# Patient Record
Sex: FEMALE | Race: WHITE | NOT HISPANIC OR LATINO | URBAN - METROPOLITAN AREA
[De-identification: names, ages, dates, MRNs, and addresses within clinical notes are randomized per-mention and may not be internally consistent; named-entity substitution may affect disease eponyms.]

---

## 2017-01-08 ENCOUNTER — EMERGENCY (EMERGENCY)
Facility: HOSPITAL | Age: 82
LOS: 1 days | Discharge: HOME | End: 2017-01-08
Attending: EMERGENCY MEDICINE | Admitting: EMERGENCY MEDICINE
Payer: MEDICARE

## 2017-01-08 VITALS
SYSTOLIC BLOOD PRESSURE: 130 MMHG | DIASTOLIC BLOOD PRESSURE: 68 MMHG | HEART RATE: 90 BPM | RESPIRATION RATE: 21 BRPM | OXYGEN SATURATION: 100 %

## 2017-01-08 VITALS
DIASTOLIC BLOOD PRESSURE: 98 MMHG | HEART RATE: 110 BPM | OXYGEN SATURATION: 96 % | RESPIRATION RATE: 25 BRPM | TEMPERATURE: 98 F | SYSTOLIC BLOOD PRESSURE: 153 MMHG

## 2017-01-08 DIAGNOSIS — E03.9 HYPOTHYROIDISM, UNSPECIFIED: ICD-10-CM

## 2017-01-08 DIAGNOSIS — Z88.1 ALLERGY STATUS TO OTHER ANTIBIOTIC AGENTS STATUS: ICD-10-CM

## 2017-01-08 DIAGNOSIS — J45.901 UNSPECIFIED ASTHMA WITH (ACUTE) EXACERBATION: ICD-10-CM

## 2017-01-08 DIAGNOSIS — R06.02 SHORTNESS OF BREATH: ICD-10-CM

## 2017-01-08 DIAGNOSIS — Z88.5 ALLERGY STATUS TO NARCOTIC AGENT: ICD-10-CM

## 2017-01-08 LAB
ALBUMIN SERPL ELPH-MCNC: 4.4 G/DL — SIGNIFICANT CHANGE UP (ref 3.3–5)
ALP SERPL-CCNC: 65 U/L — SIGNIFICANT CHANGE UP (ref 40–120)
ALT FLD-CCNC: 17 U/L RC — SIGNIFICANT CHANGE UP (ref 10–45)
ANION GAP SERPL CALC-SCNC: 17 MMOL/L — SIGNIFICANT CHANGE UP (ref 5–17)
AST SERPL-CCNC: 26 U/L — SIGNIFICANT CHANGE UP (ref 10–40)
BASOPHILS # BLD AUTO: 0 K/UL — SIGNIFICANT CHANGE UP (ref 0–0.2)
BASOPHILS NFR BLD AUTO: 0.5 % — SIGNIFICANT CHANGE UP (ref 0–2)
BILIRUB SERPL-MCNC: 0.6 MG/DL — SIGNIFICANT CHANGE UP (ref 0.2–1.2)
BUN SERPL-MCNC: 18 MG/DL — SIGNIFICANT CHANGE UP (ref 7–23)
CALCIUM SERPL-MCNC: 9.6 MG/DL — SIGNIFICANT CHANGE UP (ref 8.4–10.5)
CHLORIDE SERPL-SCNC: 101 MMOL/L — SIGNIFICANT CHANGE UP (ref 96–108)
CO2 SERPL-SCNC: 24 MMOL/L — SIGNIFICANT CHANGE UP (ref 22–31)
CREAT SERPL-MCNC: 0.94 MG/DL — SIGNIFICANT CHANGE UP (ref 0.5–1.3)
EOSINOPHIL # BLD AUTO: 0.1 K/UL — SIGNIFICANT CHANGE UP (ref 0–0.5)
EOSINOPHIL NFR BLD AUTO: 3.1 % — SIGNIFICANT CHANGE UP (ref 0–6)
GAS PNL BLDV: SIGNIFICANT CHANGE UP
GLUCOSE SERPL-MCNC: 121 MG/DL — HIGH (ref 70–99)
HCT VFR BLD CALC: 31.8 % — LOW (ref 34.5–45)
HGB BLD-MCNC: 11 G/DL — LOW (ref 11.5–15.5)
LYMPHOCYTES # BLD AUTO: 1 K/UL — SIGNIFICANT CHANGE UP (ref 1–3.3)
LYMPHOCYTES # BLD AUTO: 22.7 % — SIGNIFICANT CHANGE UP (ref 13–44)
MCHC RBC-ENTMCNC: 23 PG — LOW (ref 27–34)
MCHC RBC-ENTMCNC: 34.4 GM/DL — SIGNIFICANT CHANGE UP (ref 32–36)
MCV RBC AUTO: 66.8 FL — LOW (ref 80–100)
MONOCYTES # BLD AUTO: 0.7 K/UL — SIGNIFICANT CHANGE UP (ref 0–0.9)
MONOCYTES NFR BLD AUTO: 14.8 % — HIGH (ref 2–14)
NEUTROPHILS # BLD AUTO: 2.7 K/UL — SIGNIFICANT CHANGE UP (ref 1.8–7.4)
NEUTROPHILS NFR BLD AUTO: 58.9 % — SIGNIFICANT CHANGE UP (ref 43–77)
PLATELET # BLD AUTO: 81 K/UL — LOW (ref 150–400)
POTASSIUM SERPL-MCNC: 4 MMOL/L — SIGNIFICANT CHANGE UP (ref 3.5–5.3)
POTASSIUM SERPL-SCNC: 4 MMOL/L — SIGNIFICANT CHANGE UP (ref 3.5–5.3)
PROT SERPL-MCNC: 7.3 G/DL — SIGNIFICANT CHANGE UP (ref 6–8.3)
RAPID RVP RESULT: SIGNIFICANT CHANGE UP
RBC # BLD: 4.76 M/UL — SIGNIFICANT CHANGE UP (ref 3.8–5.2)
RBC # FLD: 17.3 % — HIGH (ref 10.3–14.5)
SODIUM SERPL-SCNC: 142 MMOL/L — SIGNIFICANT CHANGE UP (ref 135–145)
TROPONIN T SERPL-MCNC: <0.01 NG/ML — SIGNIFICANT CHANGE UP (ref 0–0.06)
WBC # BLD: 4.6 K/UL — SIGNIFICANT CHANGE UP (ref 3.8–10.5)
WBC # FLD AUTO: 4.6 K/UL — SIGNIFICANT CHANGE UP (ref 3.8–10.5)

## 2017-01-08 PROCEDURE — 87633 RESP VIRUS 12-25 TARGETS: CPT

## 2017-01-08 PROCEDURE — 82435 ASSAY OF BLOOD CHLORIDE: CPT

## 2017-01-08 PROCEDURE — 93005 ELECTROCARDIOGRAM TRACING: CPT

## 2017-01-08 PROCEDURE — 87581 M.PNEUMON DNA AMP PROBE: CPT

## 2017-01-08 PROCEDURE — 82330 ASSAY OF CALCIUM: CPT

## 2017-01-08 PROCEDURE — 71045 X-RAY EXAM CHEST 1 VIEW: CPT

## 2017-01-08 PROCEDURE — 82947 ASSAY GLUCOSE BLOOD QUANT: CPT

## 2017-01-08 PROCEDURE — 84132 ASSAY OF SERUM POTASSIUM: CPT

## 2017-01-08 PROCEDURE — 96374 THER/PROPH/DIAG INJ IV PUSH: CPT

## 2017-01-08 PROCEDURE — 85027 COMPLETE CBC AUTOMATED: CPT

## 2017-01-08 PROCEDURE — 99284 EMERGENCY DEPT VISIT MOD MDM: CPT

## 2017-01-08 PROCEDURE — 87486 CHLMYD PNEUM DNA AMP PROBE: CPT

## 2017-01-08 PROCEDURE — 99284 EMERGENCY DEPT VISIT MOD MDM: CPT | Mod: 25

## 2017-01-08 PROCEDURE — 94640 AIRWAY INHALATION TREATMENT: CPT

## 2017-01-08 PROCEDURE — 71010: CPT | Mod: 26

## 2017-01-08 PROCEDURE — 87798 DETECT AGENT NOS DNA AMP: CPT

## 2017-01-08 PROCEDURE — 82803 BLOOD GASES ANY COMBINATION: CPT

## 2017-01-08 PROCEDURE — 83605 ASSAY OF LACTIC ACID: CPT

## 2017-01-08 PROCEDURE — 80053 COMPREHEN METABOLIC PANEL: CPT

## 2017-01-08 PROCEDURE — 84484 ASSAY OF TROPONIN QUANT: CPT

## 2017-01-08 PROCEDURE — 84295 ASSAY OF SERUM SODIUM: CPT

## 2017-01-08 PROCEDURE — 85014 HEMATOCRIT: CPT

## 2017-01-08 RX ORDER — SODIUM CHLORIDE 9 MG/ML
1000 INJECTION INTRAMUSCULAR; INTRAVENOUS; SUBCUTANEOUS ONCE
Qty: 0 | Refills: 0 | Status: COMPLETED | OUTPATIENT
Start: 2017-01-08 | End: 2017-01-08

## 2017-01-08 RX ORDER — IPRATROPIUM/ALBUTEROL SULFATE 18-103MCG
3 AEROSOL WITH ADAPTER (GRAM) INHALATION ONCE
Qty: 0 | Refills: 0 | Status: COMPLETED | OUTPATIENT
Start: 2017-01-08 | End: 2017-01-08

## 2017-01-08 RX ADMIN — Medication 125 MILLIGRAM(S): at 19:09

## 2017-01-08 RX ADMIN — SODIUM CHLORIDE 1000 MILLILITER(S): 9 INJECTION INTRAMUSCULAR; INTRAVENOUS; SUBCUTANEOUS at 19:09

## 2017-01-08 RX ADMIN — Medication 3 MILLILITER(S): at 19:09

## 2017-01-08 NOTE — ED PROVIDER NOTE - CARE PLAN
Principal Discharge DX:	Asthma exacerbation  Secondary Diagnosis:	Shortness of breath Principal Discharge DX:	Asthma exacerbation  Instructions for follow-up, activity and diet:	Follow up with your PMD within 48-72 hours.    Rest, increase fluids. Take Prednisone 40mg daily for 5 days, Albuterol inhaler 2 inhalations every 4-6 hours as needed. use spacer as instructed. Take all of your other medications as previously prescribed.   Continue claritin and singulair daily, and take benadryl as needed before bed for congestion.  Return to ED for any worsening wheezing, shortness of breath, chest pain, fever, chills or any other concerning symptoms.  Secondary Diagnosis:	Shortness of breath

## 2017-01-08 NOTE — ED PROVIDER NOTE - PSH
Adult Hypothyroidism    Asthma, Currently Inactive    Beta Thalassemia Minor    Breast Cyst    Other Specified Disorders of Urinary Tract

## 2017-01-08 NOTE — ED PROVIDER NOTE - PLAN OF CARE
Follow up with your PMD within 48-72 hours.    Rest, increase fluids. Take Prednisone 40mg daily for 5 days, Albuterol inhaler 2 inhalations every 4-6 hours as needed. use spacer as instructed. Take all of your other medications as previously prescribed.   Continue claritin and singulair daily, and take benadryl as needed before bed for congestion.  Return to ED for any worsening wheezing, shortness of breath, chest pain, fever, chills or any other concerning symptoms.

## 2017-01-08 NOTE — ED PROVIDER NOTE - PROGRESS NOTE DETAILS
Patient reports cough and breathing have improved. Peak flow after treatment 350 (before 275). spo2 100% on RA. No cough on exam, lungs clear. - Brandi Yung PA-C Dr. Meraz Note: much improved, stable for dc

## 2017-01-08 NOTE — ED ADULT NURSE NOTE - OBJECTIVE STATEMENT
82 year old female with co shortness of breath. pt states she had onset of cough yesterday dry. today cough developed into wet cough., pt reports she "aspirated on water" became short of breath attempted using her inhaler and doing two neb treatments. no improvement of symptoms. called ambulance. no fever no chill denies cp lung clear respiratory rate increased 96% room air, denies abd pain no n/v seen by MD will continue to monitor

## 2017-01-08 NOTE — ED PROVIDER NOTE - OBJECTIVE STATEMENT
Pt is an 83yo F with PMH thalassemia minor, hypothyroidism, overactive bladder, and asthma BIBEMS presenting with difficulty breathing since yesterday. Reports she woke up with a cold yesterday + asthmatic cough. Used ventolin inhaler 2 x yesterday and 2 x today with minimal relief. Today was sitting at table watching TV 2 hours ago with difficulty breathing, tried neb treatment and felt dizzy, then aspirated on water and felt like she couldn't breath so called EMS. never hospitalized for asthma. Has needed steroids for asthma in past, last time over 1 year ago. Denies fever, smoking, CP, sick contacts.     PMD Stephen Goldberg Pullm Rob Schreiber

## 2017-01-08 NOTE — ED PROVIDER NOTE - ATTENDING CONTRIBUTION TO CARE
I, Dr. Timbo Meraz, saw and examined this patient and discussed the plan of care with the PA.  Pt with dyspnea, wheezing not amenable to neb at home.  No respiratory distress on exam, speaks in full sentences.

## 2021-03-04 NOTE — ED PROVIDER NOTE - RESPIRATORY DISTRESS
Outpatient Medications Marked as Taking for the 3/4/21 encounter (Telephone) with Tre Brady MD   Medication Sig Dispense Refill   • warfarin (COUMADIN) 2 MG tablet TAKE 1 TABLET BY MOUTH ON MON AND FRI AND TAKE 1 AND 1/2 TABS THE REST OF THE WEEK OR AS DIRECTED 90 tablet 0   • metoPROLOL succinate (TOPROL-XL) 50 MG 24 hr tablet Take 1 tablet by mouth daily. 90 tablet 0        Ok to leave detailed Message: Yes  Informed caller of refill policy- 24-48 hours: Yes  No call back needed unless nurse has questions.     Pharmacy: [facility] [address]    Ok to leave a detailed message.     no

## 2024-02-17 NOTE — ED ADULT NURSE NOTE - MUSCULOSKELETAL WDL
No risk alerts present
Full range of motion of upper and lower extremities, no joint tenderness/swelling.

## 2024-10-15 NOTE — ED ADULT NURSE NOTE - RESPIRATORY RATE (BREATHS/MIN)
25
Presenting with nausea and vomiting after chemotherapy on 10/10/24. Reports vomiting after eating. Chemistry w/ only hyponatremia, otherwise no significant electrolyte derangements or acidosis.  - QTc 465 10/14/24     Plan:  - Zofran 4mg prn for nausea  - Replete electrolytes as needed